# Patient Record
Sex: MALE | Race: WHITE
[De-identification: names, ages, dates, MRNs, and addresses within clinical notes are randomized per-mention and may not be internally consistent; named-entity substitution may affect disease eponyms.]

---

## 2023-09-18 ENCOUNTER — HOSPITAL ENCOUNTER (OUTPATIENT)
Dept: HOSPITAL 47 - EC | Age: 63
Setting detail: OBSERVATION
LOS: 1 days | Discharge: HOME | End: 2023-09-19
Attending: HOSPITALIST | Admitting: HOSPITALIST
Payer: COMMERCIAL

## 2023-09-18 DIAGNOSIS — K22.70: ICD-10-CM

## 2023-09-18 DIAGNOSIS — M19.90: ICD-10-CM

## 2023-09-18 DIAGNOSIS — K21.9: ICD-10-CM

## 2023-09-18 DIAGNOSIS — K29.51: Primary | ICD-10-CM

## 2023-09-18 DIAGNOSIS — F17.210: ICD-10-CM

## 2023-09-18 DIAGNOSIS — E78.5: ICD-10-CM

## 2023-09-18 DIAGNOSIS — Z88.2: ICD-10-CM

## 2023-09-18 DIAGNOSIS — Z97.2: ICD-10-CM

## 2023-09-18 DIAGNOSIS — K44.9: ICD-10-CM

## 2023-09-18 DIAGNOSIS — I10: ICD-10-CM

## 2023-09-18 DIAGNOSIS — K64.8: ICD-10-CM

## 2023-09-18 DIAGNOSIS — Z88.5: ICD-10-CM

## 2023-09-18 DIAGNOSIS — Z79.899: ICD-10-CM

## 2023-09-18 LAB
ALBUMIN SERPL-MCNC: 4.2 G/DL (ref 3.5–5)
ALP SERPL-CCNC: 82 U/L (ref 38–126)
ALT SERPL-CCNC: 21 U/L (ref 4–49)
ANION GAP SERPL CALC-SCNC: 6 MMOL/L
APTT BLD: 27.8 SEC (ref 22–30)
AST SERPL-CCNC: 25 U/L (ref 17–59)
BASOPHILS # BLD AUTO: 0 K/UL (ref 0–0.2)
BASOPHILS # BLD AUTO: 0 K/UL (ref 0–0.2)
BASOPHILS NFR BLD AUTO: 0 %
BASOPHILS NFR BLD AUTO: 0 %
BUN SERPL-SCNC: 16 MG/DL (ref 9–20)
CALCIUM SPEC-MCNC: 9.2 MG/DL (ref 8.4–10.2)
CHLORIDE SERPL-SCNC: 103 MMOL/L (ref 98–107)
CO2 SERPL-SCNC: 30 MMOL/L (ref 22–30)
EOSINOPHIL # BLD AUTO: 0.2 K/UL (ref 0–0.7)
EOSINOPHIL # BLD AUTO: 0.2 K/UL (ref 0–0.7)
EOSINOPHIL NFR BLD AUTO: 3 %
EOSINOPHIL NFR BLD AUTO: 3 %
ERYTHROCYTE [DISTWIDTH] IN BLOOD BY AUTOMATED COUNT: 4.74 M/UL (ref 4.3–5.9)
ERYTHROCYTE [DISTWIDTH] IN BLOOD BY AUTOMATED COUNT: 5.23 M/UL (ref 4.3–5.9)
ERYTHROCYTE [DISTWIDTH] IN BLOOD: 13.4 % (ref 11.5–15.5)
ERYTHROCYTE [DISTWIDTH] IN BLOOD: 13.4 % (ref 11.5–15.5)
GLUCOSE SERPL-MCNC: 114 MG/DL (ref 74–99)
HCT VFR BLD AUTO: 46.7 % (ref 39–53)
HCT VFR BLD AUTO: 51.6 % (ref 39–53)
HGB BLD-MCNC: 15.5 GM/DL (ref 13–17.5)
HGB BLD-MCNC: 16.8 GM/DL (ref 13–17.5)
INR PPP: 1 (ref ?–1.2)
LYMPHOCYTES # SPEC AUTO: 1.5 K/UL (ref 1–4.8)
LYMPHOCYTES # SPEC AUTO: 1.9 K/UL (ref 1–4.8)
LYMPHOCYTES NFR SPEC AUTO: 22 %
LYMPHOCYTES NFR SPEC AUTO: 33 %
MCH RBC QN AUTO: 32.2 PG (ref 25–35)
MCH RBC QN AUTO: 32.7 PG (ref 25–35)
MCHC RBC AUTO-ENTMCNC: 32.5 G/DL (ref 31–37)
MCHC RBC AUTO-ENTMCNC: 33.1 G/DL (ref 31–37)
MCV RBC AUTO: 98.7 FL (ref 80–100)
MCV RBC AUTO: 98.8 FL (ref 80–100)
MONOCYTES # BLD AUTO: 0.3 K/UL (ref 0–1)
MONOCYTES # BLD AUTO: 0.4 K/UL (ref 0–1)
MONOCYTES NFR BLD AUTO: 6 %
MONOCYTES NFR BLD AUTO: 6 %
NEUTROPHILS # BLD AUTO: 3.2 K/UL (ref 1.3–7.7)
NEUTROPHILS # BLD AUTO: 4.4 K/UL (ref 1.3–7.7)
NEUTROPHILS NFR BLD AUTO: 56 %
NEUTROPHILS NFR BLD AUTO: 68 %
PLATELET # BLD AUTO: 138 K/UL (ref 150–450)
PLATELET # BLD AUTO: 175 K/UL (ref 150–450)
POTASSIUM SERPL-SCNC: 4.6 MMOL/L (ref 3.5–5.1)
PROT SERPL-MCNC: 7.1 G/DL (ref 6.3–8.2)
PT BLD: 10.6 SEC (ref 9–12)
SODIUM SERPL-SCNC: 139 MMOL/L (ref 137–145)
WBC # BLD AUTO: 5.7 K/UL (ref 3.8–10.6)
WBC # BLD AUTO: 6.6 K/UL (ref 3.8–10.6)

## 2023-09-18 PROCEDURE — 99285 EMERGENCY DEPT VISIT HI MDM: CPT

## 2023-09-18 PROCEDURE — 85610 PROTHROMBIN TIME: CPT

## 2023-09-18 PROCEDURE — 85730 THROMBOPLASTIN TIME PARTIAL: CPT

## 2023-09-18 PROCEDURE — 84484 ASSAY OF TROPONIN QUANT: CPT

## 2023-09-18 PROCEDURE — 83735 ASSAY OF MAGNESIUM: CPT

## 2023-09-18 PROCEDURE — 36415 COLL VENOUS BLD VENIPUNCTURE: CPT

## 2023-09-18 PROCEDURE — 96372 THER/PROPH/DIAG INJ SC/IM: CPT

## 2023-09-18 PROCEDURE — 96374 THER/PROPH/DIAG INJ IV PUSH: CPT

## 2023-09-18 PROCEDURE — 85025 COMPLETE CBC W/AUTO DIFF WBC: CPT

## 2023-09-18 PROCEDURE — 96376 TX/PRO/DX INJ SAME DRUG ADON: CPT

## 2023-09-18 PROCEDURE — 80053 COMPREHEN METABOLIC PANEL: CPT

## 2023-09-18 PROCEDURE — 45378 DIAGNOSTIC COLONOSCOPY: CPT

## 2023-09-18 PROCEDURE — 88305 TISSUE EXAM BY PATHOLOGIST: CPT

## 2023-09-18 PROCEDURE — 43239 EGD BIOPSY SINGLE/MULTIPLE: CPT

## 2023-09-18 RX ADMIN — PANTOPRAZOLE SODIUM SCH MG: 40 INJECTION, POWDER, FOR SOLUTION INTRAVENOUS at 21:22

## 2023-09-18 RX ADMIN — NICOTINE SCH PATCH: 14 PATCH, EXTENDED RELEASE TRANSDERMAL at 13:09

## 2023-09-18 NOTE — ED
General Adult HPI





- General


Chief complaint: GI Bleed


Stated complaint: GI Bleed


Time Seen by Provider: 09/18/23 08:10


Source: patient, RN notes reviewed, old records reviewed


Mode of arrival: ambulatory


Limitations: no limitations





- History of Present Illness


Initial comments: 





This is a 62-year-old male who comes to the emergency department complaining of 

rectal bleeding.  Patient states his been on and off for years.  Patient states 

however today he went to the bathroom 6 times in the bright red blood all 6 

times.  Patient states it was quite heavy to the point where it was soaking 

through his clothes.  Patient states she has not had a colonoscopy for at least 

10 years.  Patient denies any rectal pain patient denies any abdominal pain 

palpitations any nausea vomiting or diarrhea.  Patient states it's not uncommon 

for him to go to the bathroom 6 or 7 times before 7:00 in the morning.  Patient 

denies any lightheadedness or dizziness.  Patient is not on any blood thinners.





- Related Data


                                  Previous Rx's











 Medication  Instructions  Recorded


 


Metoprolol Tartrate [Lopressor] 25 mg PO BID #60 tab 02/21/15


 


lisinopriL [Zestril] 10 mg PO DAILY #30 tab 02/21/15











                                    Allergies











Allergy/AdvReac Type Severity Reaction Status Date / Time


 


codeine AdvReac  Abdominal Verified 09/18/23 07:52





   Pain  


 


Sulfa (Sulfonamide AdvReac  Unknown Verified 09/18/23 07:52





Antibiotics)   Childhood  














Review of Systems


ROS Statement: 


Those systems with pertinent positive or pertinent negative responses have been 

documented in the HPI.





ROS Other: All systems not noted in ROS Statement are negative.





Past Medical History


Past Medical History: Hypertension


History of Any Multi-Drug Resistant Organisms: None Reported


Past Surgical History: No Surgical Hx Reported


Additional Past Anesthesia/Blood Transfusion Reaction / Comment(s): pt. denies 

having any surgeries


Past Psychological History: No Psychological Hx Reported


Smoking Status: Current every day smoker


Past Alcohol Use History: Occasional


Past Drug Use History: None Reported





General Exam





- General Exam Comments


Initial Comments: 





GENERAL:


Patient is well-developed and well-nourished.  Patient is nontoxic and well-

hydrated and is in mild distress.





ENT:


Neck is soft and supple.  No significant lymphadenopathy is noted.  Oropharynx 

is clear.  Moist mucous membranes.  Neck has full range of motion without 

eliciting any pain.  





EYES:


The sclera were anicteric and conjunctiva were pink and moist.  Extraocular 

movements were intact and pupils were equal round and reactive to light.  

Eyelids were unremarkable.





PULMONARY:


Unlabored respirations.  Good breath sounds bilaterally.  No audible rales 

rhonchi or wheezing was noted.





CARDIOVASCULAR:


There is a regular rate and rhythm without any murmurs gallops or rubs. 





ABDOMEN:


Soft and nontender with normal bowel sounds.  





RECTAL:


On rectal exam there is a friable mass at the 9 o'clock position measures about 

2 cm in length and 1 cm in width it is tender to palpation





SKIN:


Skin is clear with no lesions or rashes and otherwise unremarkable.





NEUROLOGIC:


Patient is alert and oriented x3.  Cranial nerves II through XII are grossly 

intact.  Motor and sensory are also intact.  Normal speech, volume and content. 

 Symmetrical smile.  





MUSCULOSKELETAL:


Normal extremities with adequate strength and full range of motion.  





LYMPHATICS:


No significant lymphadenopathy is noted





PSYCHIATRIC:


Normal psychiatric evaluation.  


Limitations: no limitations





Course


                                   Vital Signs











  09/18/23 09/18/23 09/18/23





  07:51 08:42 09:00


 


Temperature 98.1 F  


 


Pulse Rate 71 60 68


 


Respiratory 20 16 16





Rate   


 


Blood Pressure 125/75 125/92 130/90


 


O2 Sat by Pulse 99 98 98





Oximetry   














Medical Decision Making





- Medical Decision Making





Was pt. sent in by a medical professional or institution (JOIE Brand, NP, urgent 

care, hospital, or nursing home...) When possible be specific


@  -No


Did you speak to anyone other than the patient for history (EMS, parent, family,

 police, friend...)? What history was obtained from this source 


@  -No


Did you review nursing and triage notes (agree or disagree)?  Why? 


@  -I reviewed and agree with nursing and triage notes


Were old charts reviewed (outside hosp., previous admission, EMS record, old 

EKG, old radiological studies, urgent care reports/EKG's, nursing home records)?

 Report findings 


@  -No old charts were reviewed


Differential Diagnosis (chest pain, altered mental status, abdominal pain women,

 abdominal pain men, vaginal bleeding, weakness, fever, dyspnea, syncope, 

headache, dizziness, GI bleed, back pain, seizure, CVA, palpatations, mental 

health, musculoskeletal)? 


@  -Differential GI Bleed:


Esophageal varices, aortoenteric fistula, Josephine-Lance, gastritis, peptic ulcer

 disease, diverticulosis, inflammatory bowel disease, hemorrhoids, fissure, 

colitis, malignancy, Meckels diverticulum, this is not meant to be an all-

inclusive list.


EKG interpreted by me (3pts min.).


@  -As above


X-rays interpreted by me (1pt min.).


@  -None done


CT interpreted by me (1pt min.).


@  -None done


U/S interpreted by me (1pt. min.).


@  -None done


What testing was considered but not performed or refused? (CT, X-rays, U/S, 

labs)? Why?


@  -None


What meds were considered but not given or refused? Why?


@  -None


Did you discuss the management of the patient with other professionals 

(professionals i.e. , PA, NP, lab, RT, psych nurse, , , 

teacher, , )? Give summary


@  -I spoke with Dr. Marks she will do a colonoscopy tomorrow.  I spoke with 

[facility] see we'll admit the patient I'll admit the patient write 

admitting orders


Was smoking cessation discussed for >3mins.?


@  -No


Was critical care preformed (if so, how long)?


@  -No


Were there social determinants of health that impacted care today? How? 

(Homelessness, low income, unemployed, alcoholism, drug addiction, 

transportation, low edu. Level, literacy, decrease access to med. care, senior care, 

rehab)?


@  -No


Was there de-escalation of care discussed even if they declined (Discuss DNR or 

withdrawal of care, Hospice)? DNR status


@  -No


What co-morbidities impacted this encounter? (DM, HTN, Smoking, COPD, CAD, 

Cancer, CVA, ARF, Chemo, Hep., AIDS, mental health diagnosis, sleep apnea, 

morbid obesity)?


@  -None


Was patient admitted / discharged? Hospital course, mention meds given and 

route, prescriptions, significant lab abnormalities, going to OR and other 

pertinent info.


@  -Patient had a friable mass next to the rectum more consistent with a mass in

 the hemorrhoids so I spoke with Dr. solis she agreed to do a colonoscopy 

tomorrow.  I spoke with the [facility] hospice they agreed to admit the patient

 admitted the patient wrote admitting orders 


Undiagnosed new problem with uncertain prognosis?


@  -No


Drug Therapy requiring intensive monitoring for toxicity (Heparin, Nitro, 

Insulin, Cardizem)?


@  -No


Were any procedures done?


@  -No


Diagnosis/symptom?


@  -Rectal mass


Acute, or Chronic, or Acute on Chronic?


@  -Acute


Uncomplicated (without systemic symptoms) or Complicated (systemic symptoms)?


@  -Complicated


Side effects of treatment?


@  -No


Exacerbation, Progression, or Severe Exacerbation?


@  -No


Poses a threat to life or bodily function? How? (Chest pain, USA, MI, pneumonia,

 PE, COPD, DKA, ARF, appy, cholecystitis, CVA, Diverticulitis, Homicidal, 

Suicidal, threat to staff... and all critical care pts)


@  -No


Diagnosis/symptom?


@  -GI bleed


Acute, or Chronic, or Acute on Chronic?


@  -Acute on chronic


Uncomplicated (without systemic symptoms) or Complicated (systemic symptoms)?


@  -Complicated


Side effects of treatment?


@  -none


Exacerbation, Progression, or Severe Exacerbation]


@  -no


Poses a threat to life or bodily function?


@  -Yes this could lead to anemia hypoperfusion





- Lab Data


Result diagrams: 


                                 09/18/23 08:14





                                 09/18/23 08:14


                                   Lab Results











  09/18/23 09/18/23 09/18/23 Range/Units





  08:14 08:14 08:14 


 


WBC  6.6    (3.8-10.6)  k/uL


 


RBC  5.23    (4.30-5.90)  m/uL


 


Hgb  16.8    (13.0-17.5)  gm/dL


 


Hct  51.6    (39.0-53.0)  %


 


MCV  98.8    (80.0-100.0)  fL


 


MCH  32.2    (25.0-35.0)  pg


 


MCHC  32.5    (31.0-37.0)  g/dL


 


RDW  13.4    (11.5-15.5)  %


 


Plt Count  175    (150-450)  k/uL


 


MPV  8.4    


 


Neutrophils %  68    %


 


Lymphocytes %  22    %


 


Monocytes %  6    %


 


Eosinophils %  3    %


 


Basophils %  0    %


 


Neutrophils #  4.4    (1.3-7.7)  k/uL


 


Lymphocytes #  1.5    (1.0-4.8)  k/uL


 


Monocytes #  0.4    (0-1.0)  k/uL


 


Eosinophils #  0.2    (0-0.7)  k/uL


 


Basophils #  0.0    (0-0.2)  k/uL


 


PT   10.6   (9.0-12.0)  sec


 


INR   1.0   (<1.2)  


 


APTT   27.8   (22.0-30.0)  sec


 


Sodium    139  (137-145)  mmol/L


 


Potassium    4.6  (3.5-5.1)  mmol/L


 


Chloride    103  ()  mmol/L


 


Carbon Dioxide    30  (22-30)  mmol/L


 


Anion Gap    6  mmol/L


 


BUN    16  (9-20)  mg/dL


 


Creatinine    0.99  (0.66-1.25)  mg/dL


 


Est GFR (CKD-EPI)AfAm    >90  (>60 ml/min/1.73 sqM)  


 


Est GFR (CKD-EPI)NonAf    82  (>60 ml/min/1.73 sqM)  


 


Glucose    114 H  (74-99)  mg/dL


 


Calcium    9.2  (8.4-10.2)  mg/dL


 


Total Bilirubin    1.2  (0.2-1.3)  mg/dL


 


AST    25  (17-59)  U/L


 


ALT    21  (4-49)  U/L


 


Alkaline Phosphatase    82  ()  U/L


 


Total Protein    7.1  (6.3-8.2)  g/dL


 


Albumin    4.2  (3.5-5.0)  g/dL














Disposition


Clinical Impression: 


 GI bleed, Rectal mass





Disposition: ADMITTED AS IP TO THIS [facility]


Referrals: 


Saleem Barcenas [Primary Care Provider] - 1-2 days


Time of Disposition: 09:35

## 2023-09-18 NOTE — HP
HISTORY AND PHYSICAL



CHIEF COMPLAINT:

GI bleed.



HISTORY OF PRESENT ILLNESS:

This is a 62-year-old gentleman with a past medical history of hypertension and history

of EtOH, who has presented with rectal bleeding.  The patient reports that the patient

had bleeding on and off for the past several years, and the blood was bright red in

color.  The patient had previous colonoscopy about 10 years ago.  There is no history

of any fever, rigor, or chills at this time.



PAST MEDICAL HISTORY:

Hypertension and EtOH.



HOME MEDICATIONS:

Zestril.  Doses and rest of the medications are reviewed.



ALLERGIES:

Include codeine.



FAMILY HISTORY:

No history of heart disease or strokes in the family.



SOCIAL HISTORY:

EtOH and smoking.



REVIEW OF SYSTEMS:

Fourteen-point review is negative except as mentioned earlier.



PHYSICAL EXAMINATION:

VITAL SIGNS:  Pulse 60, blood pressure 125/92, respirations 16.

HEENT:  Conjunctivae are normal.

NECK:  No jugular venous distention.

CARDIOVASCULAR:  S1 and S2 muffled.

RESPIRATORY:  Breath sounds diminished at the bases.

ABDOMEN:  Soft and nontender.  No masses palpable.

LEGS:  No edema.

NERVOUS SYSTEM:  Nonfocal.

SKIN:  No ulcers or rashes.

JOINTS:  No active deforming arthropathy.



LABORATORY DATA:

Hemoglobin is 16.8.



ASSESSMENT:

1. Acute lower gastrointestinal bleeding.

2. History of EtOH.

3. Hypertension.

4. Multiple complex medical issues.

5. History of nicotine dependence.



RECOMMENDATIONS AND DISCUSSION:

In this 62-year-old gentleman presented with multiple complex medical issues, we will

monitor the patient closely.  I would recommend Gastroenterology consultation, possible

endoscopies, H and H q.6, and monitor hemoglobin closely.  Transfuse accordingly.  Hold

off anticoagulants at this time.  Otherwise, monitor blood pressure closely.  Watch for

EtOH withdrawal symptoms and CIWA protocol.  Prognosis is guarded.  Further

recommendations to follow.  See orders for the details.





MMODL / IJN: [id number] / Job#: [id number]

## 2023-09-18 NOTE — P.CONS
History of Present Illness





- Reason for Consult


Consult date: 09/18/23


GI bleed, rectal mass


Requesting physician: David Mcqueen





- Chief Complaint


Rectal bleeding





- History of Present Illness





This is a pleasant 62-year-old male with a history of hypertension and 

hyperlipidemia as well as ongoing rectal bleeding for several years who presen

chace to the emergency department for increased rectal bleeding.  Patient states 

that he has had off-and-on rectal bleeding for several years which he attributes

to hemorrhoids.  He states that yesterday and today he started having large 

amounts of rectal bleeding, was saturating through his clothes.  He came to the 

emergency department for further evaluation.  Patient states he has no abdominal

pain or cramping associated with the bleeding.  He states that he does however 

have ongoing upper abdominal discomfort every morning.  States he has 5-7 loose 

bowel movements every morning.  He's had no prior upper endoscopic evaluation, 

and last colonoscopy was at least 10 years ago.  He denies any anticoagulation, 

no regular NSAID use.





Review of Systems





REVIEW OF SYSTEMS:


CARDIOPULMONARY: No chest pain or shortness of breath.  


Gastrointestinal: Upper abdominal/epigastric discomfort, daily mostly in the 

morning.   No nausea or vomiting.  No hematemesis, coffee-ground emesis.  Rectal

bleeding, Amol red.  Loose stools 5-7 times daily, chronic.


GENITOURINARY:  No dysuria or hematuria. 


MUSCULOSKELETAL: Reports normal range of motion.


SKIN: No rashes.  No jaundice.


ENDOCRINE: No chills, fevers.  No excessive weight gain or loss.  No polydipsia 

or polyuria.


PSYCHIATRIC: Unremarkable. 


NEUROLOGY: No change in mental status.  Denies dizziness, headache.


ENT: Vision unremarkable. 


CONSTITUTIONAL: No recent weight loss. No fever, chills, night sweats. 





Past Medical History


Past Medical History: Hypertension


History of Any Multi-Drug Resistant Organisms: None Reported


Past Surgical History: No Surgical Hx Reported


Additional Past Anesthesia/Blood Transfusion Reaction / Comm: pt. denies having 

any surgeries


Past Psychological History: No Psychological Hx Reported


Smoking Status: Current every day smoker


Past Alcohol Use History: Occasional


Past Drug Use History: None Reported





Medications and Allergies


                                Home Medications











 Medication  Instructions  Recorded  Confirmed  Type


 


Metoprolol Tartrate [Lopressor] 50 mg PO HS 09/18/23 09/18/23 History


 


Pantoprazole [Protonix] 40 mg PO HS 09/18/23 09/18/23 History


 


amLODIPine [Norvasc] 10 mg PO HS 09/18/23 09/18/23 History


 


lisinopriL [Zestril] 30 mg PO HS 09/18/23 09/18/23 History








                                    Allergies











Allergy/AdvReac Type Severity Reaction Status Date / Time


 


codeine AdvReac  Abdominal Verified 09/18/23 10:02





   Pain  


 


Sulfa (Sulfonamide AdvReac  Nausea & Verified 09/18/23 10:02





Antibiotics)   Vomiting  














Physical Exam


Vitals: 


                                   Vital Signs











  Temp Pulse Resp BP Pulse Ox


 


 09/18/23 09:00   68  16  130/90  98


 


 09/18/23 08:42   60  16  125/92  98


 


 09/18/23 07:51  98.1 F  71  20  125/75  99








                                Intake and Output











 09/17/23 09/18/23 09/18/23





 22:59 06:59 14:59


 


Other:   


 


  Weight   89.811 kg














General appearance: The patient is alert, oriented, appears in no acute 

distress.


HET: Head is normocephalic and atraumatic.  Conjunctiva pink.  Sclera anicteric.


Neck: Supple without lymphadenopathy.  Trachea midline.


Heart: Regular.


Lungs: Equal expansion, normal respiratory effort.


Abdomen: Soft, nontender, nondistended with  bowel sounds.  No guarding or 

rigidity.


Rectum: Patient with beefy red thrombosed/prolapsed hemorrhoid.  No bleeding.


Skin:  No rashes. No jaundice.


Extremities: Normal skin color and turgor.  No pedal edema.


Neurological: No focal deficits.  Alert and oriented x3.





Results


CBC & Chem 7: 


                                 09/18/23 08:14





                                 09/18/23 08:14


Labs: 


                  Abnormal Lab Results - Last 24 Hours (Table)











  09/18/23 Range/Units





  08:14 


 


Glucose  114 H  (74-99)  mg/dL














Assessment and Plan


(1) GI bleed


Narrative/Plan: 


62-year-old male presenting with rectal bleeding that has been going on for 

years small amounts happens with that bowel movement.  Patient attributed it to 

hemorrhoids.  However in the last 1-2 days he started having significant amount 

of rectal bleeding where was saturating has close.  States that he has been able

 to feel that there is something on the external anus he has not had any 

evaluation until now.  States last colonoscopy was greater than 10 years ago.  

He denies any anticoagulation or NSAID use.  States bleeding is bright red, it 

is painless.  Hemoglobin was stable on admission at 16.8 platelet count 175,000.

  Patient rectal exam revealed what looks like a thrombosed/prolapsed h

emorrhoid.  Recommend further evaluation with colonoscopy.


Current Visit: Yes   Status: Acute   Code(s): K92.2 - GASTROINTESTINAL 

HEMORRHAGE, UNSPECIFIED   SNOMED Code(s): 89715873


   





(2) Epigastric pain


Narrative/Plan: 


Patient with long-standing history of chronic epigastric pain, mostly in the 

morning.  Does not take any medication regularly.  Will proceed with upper 

endoscopy.  Protonix 40 mg twice a day added.


Current Visit: Yes   Status: Acute   Code(s): R10.13 - EPIGASTRIC PAIN   SNOMED 

Code(s): 13754576


   


Plan: 





1.  Continue symptomatic and supportive care


2.  Daily CBC and transfuse for hemoglobin less than 7


3.  Continue Protonix


4.  Clear liquid diet, nothing by mouth after midnight


5.  Plan for EGD/colonoscopy tomorrow.  Procedure discussed with patient 

including risks and benefits.  Patient agreeable to proceed.


6.  Further recommendations forthcoming based on clinical course


Thank you for this consultation, we will continue to follow.





Dr. OLGA Marks


I agree with the dictator's note, documented as a scribe by No VERA.

## 2023-09-19 VITALS — DIASTOLIC BLOOD PRESSURE: 89 MMHG | SYSTOLIC BLOOD PRESSURE: 132 MMHG | HEART RATE: 62 BPM

## 2023-09-19 VITALS — TEMPERATURE: 97.7 F | RESPIRATION RATE: 18 BRPM

## 2023-09-19 LAB
ALBUMIN SERPL-MCNC: 3.9 D/DL (ref 3.8–4.9)
ALBUMIN/GLOB SERPL: 1.86 RATIO (ref 1.6–3.17)
ALP SERPL-CCNC: 85 U/L (ref 41–126)
ALT SERPL-CCNC: 24 U/L (ref 10–49)
ANION GAP SERPL CALC-SCNC: 7.3 MMOL/L (ref 4–12)
AST SERPL-CCNC: 23 U/L (ref 14–35)
BASOPHILS # BLD AUTO: 0.05 X 10*3/UL (ref 0–0.1)
BASOPHILS NFR BLD AUTO: 0.8 %
BUN SERPL-SCNC: 8.1 MG/DL (ref 9–27)
BUN/CREAT SERPL: 9 RATIO (ref 12–20)
CALCIUM SPEC-MCNC: 9.1 MG/DL (ref 8.7–10.3)
CHLORIDE SERPL-SCNC: 103 MMOL/L (ref 96–109)
CO2 SERPL-SCNC: 27.7 MMOL/L (ref 21.6–31.8)
EOSINOPHIL # BLD AUTO: 0.16 X 10*3/UL (ref 0.04–0.35)
EOSINOPHIL NFR BLD AUTO: 2.6 %
ERYTHROCYTE [DISTWIDTH] IN BLOOD BY AUTOMATED COUNT: 5.15 X 10*6/UL (ref 4.4–5.6)
ERYTHROCYTE [DISTWIDTH] IN BLOOD: 13.9 % (ref 11.5–14.5)
GLOBULIN SER CALC-MCNC: 2.1 D/DL (ref 1.6–3.3)
GLUCOSE SERPL-MCNC: 97 MG/DL (ref 70–110)
HCT VFR BLD AUTO: 49.1 % (ref 39.6–50)
HGB BLD-MCNC: 16.3 D/DL (ref 13–17)
IMM GRANULOCYTES BLD QL AUTO: 0.3 %
LYMPHOCYTES # SPEC AUTO: 1.75 X 10*3/UL (ref 0.9–5)
LYMPHOCYTES NFR SPEC AUTO: 28.5 %
MCH RBC QN AUTO: 31.7 PG (ref 27–32)
MCHC RBC AUTO-ENTMCNC: 33.2 D/DL (ref 32–37)
MCV RBC AUTO: 95.3 FL (ref 80–97)
MONOCYTES # BLD AUTO: 0.51 X 10*3/UL (ref 0.2–1)
MONOCYTES NFR BLD AUTO: 8.3 %
NEUTROPHILS # BLD AUTO: 3.65 X 10*3/UL (ref 1.8–7.7)
NEUTROPHILS NFR BLD AUTO: 59.5 %
NRBC BLD AUTO-RTO: 0 X 10*3/UL (ref 0–0.01)
PLATELET # BLD AUTO: 176 X 10*3/UL (ref 140–440)
POTASSIUM SERPL-SCNC: 4.5 MMOL/L (ref 3.5–5.5)
PROT SERPL-MCNC: 6 D/DL (ref 6.2–8.2)
SODIUM SERPL-SCNC: 138 MMOL/L (ref 135–145)
WBC # BLD AUTO: 6.14 X 10*3/UL (ref 4.5–10)

## 2023-09-19 RX ADMIN — NICOTINE SCH PATCH: 14 PATCH, EXTENDED RELEASE TRANSDERMAL at 08:06

## 2023-09-19 RX ADMIN — PANTOPRAZOLE SODIUM SCH MG: 40 INJECTION, POWDER, FOR SOLUTION INTRAVENOUS at 08:06

## 2023-09-19 NOTE — P.PCN
Date of Procedure: 09/19/23


Procedure(s) Performed: 


Brief history:


Patient is a pleasant 62-year-old white male admitted to the hospital with 

epigastric pain and rectal bleeding for the last 3 days' duration.  He is 

scheduled for an  upper endoscopy as well as colonoscopy to evaluate further





Procedure performed:


Esophagogastroduodenoscopy with biopsy


Colonoscopy





Preoperative diagnosis:


Epigastric pain and heartburn


Rectal bleeding





Anesthesia: MAC





Procedure:


After informed consent was obtained from the patient  was brought into the 

endoscopy unit and IV  sedation was administered by anesthesia under continuous 

monitoring.  Initially upper endoscopy was done.  The Olympus  video 

endoscope was inserted inserted into the mouth and esophagus intubated without 

any difficulty and was gradually advanced into the stomach and duodenum and 

carefully examined.  The bulb and second part of the duodenum appeared normal.  

The scope was then withdrawn into the stomach adequately insufflated with air 

and upon careful examination  the antrum had mild gastritis and biopsies were 

done from this area.  Mucosa of the body, cardia and fundus appeared normal.  

The scope was then withdrawn into the esophagus.  Mall hiatal hernia noted.  The

GE junction was located at 40 cm to the incisors.  There was a short segment of 

Lainez's esophagus esophagus extended 5-6 mm proximal to the GE junction which 

was biopsied.   Rest of the esophagus appeared normal.  Patient tolerated the 

procedure well.





At this time the patient continued to remain sedation.  Initial digital rectal 

examination  revealed a small prolapsed internal hemorrhoids.  No bleeding 

identified. Olympus  video colonoscope was then inserted into the rectum 

and gradually advanced to the cecum without any difficulty.  Careful examination

was performed as the scope was gradually being withdrawn.  The prep was 

excellent.  The cecum, ascending colon, transverse colon, descending colon, 

sigmoid colon and rectum appeared normal.  Retroflexion was performed in the 

rectum and  small internal hemorrhoidswere noted.  Patient tolerated the 

procedure well.





Impression:


1.  Upper endoscopy revealed small hiatal hernia, short segment Lainez's 

esophagus and mild gastritis


2.  Colonoscopy revealed prolapsed small internal hemorrhoids, but no evidence 

of colorectal neoplasia 








Recommendations:


Findings of this examination were discussed with the patient. .  He was advised 

to follow with the biopsy results.  Recommend a high-fiber diet and avoid 

straining and constipation.  At about suppository once at bedtime for 2 weeks.  

He can be discharged home today.  Follow up in office in 2 weeks.

## 2023-09-20 NOTE — P.DS
Providers


Date of admission: 


09/18/23 09:38





Expected date of discharge: 09/19/23


Attending physician: 


Natalie Albrecht





Consults: 





                                        





09/18/23 09:36


Consult Physician Urgent 


   Consulting Provider: Shanique Marks


   Consult Reason/Comments: GI bleed, rectal mass


   Do you want consulting provider notified?: Yes











Primary care physician: 


Saleem Barcenas





Hospital Course: 











Final diagnosis





Acute lower gastrointestinal bleeding, secondary to hemorrhoids


History of EtOH


Hypertension


prolapsed small hemorrhoid on colonoscopy


Small hiatal hernia and short segment Lainez's esophagus with mild gastritis on

EGD 


GI prophylaxis


DVT prophylaxis


Full code 








History of nicotine dependenceDischarge disposition


Patient is being discharged in a stable condition with guarded prognosis to 

home.  Patient will follow-up with Dr. Barcenas  in the outpatient setting upon 

discharge.  Patient is to continue with Protonix along with Anusol and close 

outpatient follow-up with GI as scheduled.  Total time taken is greater than 35 

minutes.





Hospital course


This is a 62-year-old male who was recently admitted with with rectal bleeding. 

Patient closely monitored and evaluated by GI and underwent EGD with colonoscop

y.  Patient did have multiple biopsies obtained and will follow-up with GI in 

the outpatient setting continue on Protonix daily along with Anusol.  Upper 

endoscopy revealed a small hiatal hernia with short segment Lainez's esophagus 

with mild gastritis and colonoscopy revealed prolapsed small internal hemorrhoid

with no evidence of neoplasia.  Patient will continue on Anusol and follow-up 

with Dr. Marks  in the outpatient setting.  Please refer to other consultation 

notes for further HPI.  Currently no reports of chest pain, shortness of breath,

or palpitations.  Patient is afebrile.  No reports of nausea or vomiting and 

patient is tolerating diet.  Patient will be   discharged home today. patient 

has been encouraged to complete alcohol cessation.  





Physical exam:








Gen: This is a 62-year-old male who is awake, alert and oriented 3, well-

developed, well-nourished


HEENT: Head is atraumatic, normocephalic. Pupils equal, round. Sclerae is 

anicteric. 


NECK: Supple. No JVD. No lymphadenopathy. No thyromegaly. 


LUNGS: Clear to auscultation. No wheezes or rhonchi.  No intercostal 

retractions.


HEART: Regular rate and rhythm. No murmur. 


ABDOMEN: Soft. Bowel sounds are present. No masses.  No tenderness.


EXTREMITIES: No pedal edema.  No calf tenderness.


NEUROLOGICAL: Patient is awake, alert and oriented x3. Cranial nerves 2 through 

12 are grossly intact. 





Please refer to medication reconciliation sheet for a list of medications.





The impression and plan of care has been dictated by Mariana Sagastume, Nurse 

Practitioner as directed.





Dr. Ambrocio MD


I have performed a history and examination and MDM of this patient, discussed 

the same with the dictator, and  agree with the dictator's assessment and plan 

as written ,documented as a scribe. Based on total visit time,  I have performed

more than 50% of the visit.  


Patient Condition at Discharge: Fair





Plan - Discharge Summary


Discharge Rx Participant: No


New Discharge Prescriptions: 


New


   Hydrocortisone Suppository [Anusol-Hc] 25 mg RECTAL HS #14 suppositor


   Thiamine [Vitamin B-1] 100 mg PO DAILY #30 tab


   Nicotine 14Mg/24Hr Patch [Habitrol] 1 patch TRANSDERM DAILY  patch





Continue


   amLODIPine [Norvasc] 10 mg PO HS


   Metoprolol Tartrate [Lopressor] 50 mg PO HS


   lisinopriL [Zestril] 30 mg PO HS





Changed


   Pantoprazole [Protonix] 40 mg PO DAILY #30 tab


Discharge Medication List





Metoprolol Tartrate [Lopressor] 50 mg PO HS 09/18/23 [History]


amLODIPine [Norvasc] 10 mg PO HS 09/18/23 [History]


lisinopriL [Zestril] 30 mg PO HS 09/18/23 [History]


Hydrocortisone Suppository [Anusol-Hc] 25 mg RECTAL HS #14 suppositor 09/19/23 

[Rx]


Nicotine 14Mg/24Hr Patch [Habitrol] 1 patch TRANSDERM DAILY  patch 09/19/23 [Rx]


Pantoprazole [Protonix] 40 mg PO DAILY #30 tab 09/19/23 [Rx]


Thiamine [Vitamin B-1] 100 mg PO DAILY #30 tab 09/19/23 [Rx]








Follow up Appointment(s)/Referral(s): 


Shanique Marks MD [STAFF PHYSICIAN] - 10/02/23 3:00 pm


Saleem Barcenas [Primary Care Provider] - 09/22/23 10:45 am (appointment with 

Arelis WESLEY)


Patient Instructions/Handouts:  Thiamine (By mouth), Hydrocortisone Enema (Into 

the rectum), Pantoprazole (By mouth), Gastrointestinal Bleeding (DC)


Activity/Diet/Wound Care/Special Instructions: 


Activity Limited until follow-up


Follow-up primary care provider on discharge


Follow-up GI outpatient


Continue taking medications as prescribed


Continue tobacco cessation


Avoid alcohol use and exposure





Discharge Disposition: HOME SELF-CARE